# Patient Record
Sex: MALE | URBAN - METROPOLITAN AREA
[De-identification: names, ages, dates, MRNs, and addresses within clinical notes are randomized per-mention and may not be internally consistent; named-entity substitution may affect disease eponyms.]

---

## 2024-09-20 ENCOUNTER — OFFICE VISIT (OUTPATIENT)
Dept: URBAN - METROPOLITAN AREA CLINIC 68 | Facility: CLINIC | Age: 79
End: 2024-09-20

## 2025-01-07 ENCOUNTER — DASHBOARD ENCOUNTERS (OUTPATIENT)
Age: 80
End: 2025-01-07

## 2025-01-07 ENCOUNTER — OFFICE VISIT (OUTPATIENT)
Dept: URBAN - METROPOLITAN AREA CLINIC 63 | Facility: CLINIC | Age: 80
End: 2025-01-07
Payer: COMMERCIAL

## 2025-01-07 ENCOUNTER — LAB OUTSIDE AN ENCOUNTER (OUTPATIENT)
Dept: URBAN - METROPOLITAN AREA CLINIC 63 | Facility: CLINIC | Age: 80
End: 2025-01-07

## 2025-01-07 VITALS
SYSTOLIC BLOOD PRESSURE: 120 MMHG | HEART RATE: 65 BPM | WEIGHT: 165 LBS | DIASTOLIC BLOOD PRESSURE: 62 MMHG | TEMPERATURE: 97.8 F | HEIGHT: 70 IN | OXYGEN SATURATION: 95 % | BODY MASS INDEX: 23.62 KG/M2

## 2025-01-07 DIAGNOSIS — Z86.73 HISTORY OF CVA (CEREBROVASCULAR ACCIDENT): ICD-10-CM

## 2025-01-07 DIAGNOSIS — R13.12 OROPHARYNGEAL DYSPHAGIA: ICD-10-CM

## 2025-01-07 PROBLEM — 71457002: Status: ACTIVE | Noted: 2025-01-07

## 2025-01-07 PROCEDURE — 99204 OFFICE O/P NEW MOD 45 MIN: CPT

## 2025-01-07 RX ORDER — IPRATROPIUM BROMIDE AND ALBUTEROL SULFATE 2.5; .5 MG/3ML; MG/3ML
SOLUTION RESPIRATORY (INHALATION)
Qty: 90 MILLILITER | Status: ACTIVE | COMMUNITY

## 2025-01-07 RX ORDER — METOPROLOL TARTRATE 50 MG/1
TAKE 1 TABLET BY MOUTH TWICE DAILY TABLET, FILM COATED ORAL
Qty: 180 EACH | Refills: 0 | Status: ACTIVE | COMMUNITY

## 2025-01-07 RX ORDER — FLUOXETINE 10 MG/1
TAKE 1 CAPSULE BY MOUTH DAILY IN THE MORNING FOR 2 WEEKS THEN TAKE 2 CAPSULES BY MOUTH DAILY CAPSULE ORAL
Qty: 45 EACH | Refills: 0 | Status: ACTIVE | COMMUNITY

## 2025-01-07 RX ORDER — ATORVASTATIN CALCIUM 20 MG/1
TAKE 1 TABLET VIA PEG TUBE EVERY DAY TABLET ORAL
Qty: 30 EACH | Refills: 0 | Status: ACTIVE | COMMUNITY

## 2025-01-07 RX ORDER — APIXABAN 5 MG/1
TAKE 1 TABLET BY MOUTH TWICE DAILY TABLET, FILM COATED ORAL
Qty: 170 EACH | Refills: 0 | Status: ACTIVE | COMMUNITY

## 2025-01-07 RX ORDER — DONEPEZIL HYDROCHLORIDE 10 MG/1
TAKE 1 TABLET BY MOUTH EVERY NIGHT AT BEDTIME TABLET, FILM COATED ORAL
Qty: 90 EACH | Refills: 0 | Status: ACTIVE | COMMUNITY

## 2025-01-07 RX ORDER — IPRATROPIUM BROMIDE AND ALBUTEROL SULFATE 2.5; .5 MG/3ML; MG/3ML
USE 1 VIAL VIA NEBULIZER EVERY 6 HOURS AS NEEDED FOR COUGH / WHEEZING AS DIRECTED SOLUTION RESPIRATORY (INHALATION)
Qty: 90 MILLILITER | Refills: 0 | Status: ACTIVE | COMMUNITY

## 2025-01-07 RX ORDER — METFORMIN HYDROCHLORIDE 500 MG/1
TAKE 1 TABLET BY MOUTH TWICE DAILY TABLET, FILM COATED ORAL
Qty: 180 EACH | Refills: 0 | Status: ACTIVE | COMMUNITY

## 2025-01-07 NOTE — HPI-PREVIOUS IMAGING
9/28/2024 chest x-ray consistent with cardiomegaly, blunting of left costophrenic angle suggestive of left-sided mild pleural effusion and thickening with no interval changing 9/28/2024 CT chest abdomen pelvis without contrast consistent with Interval resolution of subpleural consolidation atelectatic changes in posterior aspect of left lung Mildly displaced fractures of right 6th through 8th ribs and lateral aspect, new interval finding of compression fracture L1, 15 to 20 mm gallbladder calculi with no evidence of acute cholecystitis Redemonstration of faintly hypodense 13 mm lesion in tail of pancreas Uncomplicated colonic diverticulosis Mild thick walled urinary bladder showing 2 mm calculus and 3 mm calcific foci calculus Mild prostatomegaly

## 2025-01-07 NOTE — HPI-ZZZTODAY'S VISIT
Patient is a very pleasant 79-year-old male who presents for barium swallow.  He is a new patient to our practice will be establishing with Dr. Lo today.  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  Past surgical history reviewed. Diet:Glucerna 355 mL via PEG 3 times a day flush PEG with 200 mL free water before and after each PEG feed.  Flush PEG tube before and after medication administration of 30 mL free water.  Patient presents with his daughter's primary caregiver for possible barium swallow.  He is nonverbal although she expresses that he is able to understand some Amharic to follow directions.  She relates that he had a CVA in February 2024.  He had a peg tube placed at that time due to inability to protect his airway.  He had some complications in October 2024 with blood coming out of the PEG tube but since then has had no difficulty.  Previously he followed with an at-home speech-language pathologist, however at the end of the year they lost coverage for this provider.  As of now she is getting Glucerna over-the-counter but is running low on syringes and supplies for the PEG tube.  He has not followed with nutritionist outpatient.  She would like to have him reevaluated for his swallowing capabilities.

## 2025-01-07 NOTE — PHYSICAL EXAM GASTROINTESTINAL
Abdomen , soft, nontender, nondistended , no guarding or rigidity , no masses palpable , normal bowel sounds , Liver and Spleen, no hepatosplenomegaly , liver nontender, peg tube within normal limits with no cracking or yellowing, well healed

## 2025-01-07 NOTE — HPI-PREVIOUS LABS
9/29/2024 CMP significant for sodium 149, chloride 112, BUN 47, creatinine 1.65, albumin 2.7, GFR 42.2, BUN/creatinine ratio 28.5, CBC significant for WBC within normal limits, RBC 4.24, hemoglobin 11.3, hematocrit 35.4, MCH 26.7, MCHC 31.9, RDW 15.9, MPV 13.6, monocytes 1.0

## 2025-01-08 ENCOUNTER — TELEPHONE ENCOUNTER (OUTPATIENT)
Dept: URBAN - METROPOLITAN AREA CLINIC 63 | Facility: CLINIC | Age: 80
End: 2025-01-08

## 2025-01-09 ENCOUNTER — TELEPHONE ENCOUNTER (OUTPATIENT)
Dept: URBAN - METROPOLITAN AREA CLINIC 63 | Facility: CLINIC | Age: 80
End: 2025-01-09

## 2025-01-13 PROBLEM — 275526006: Status: ACTIVE | Noted: 2025-01-13

## 2025-01-13 PROBLEM — 70342003: Status: ACTIVE | Noted: 2025-01-13

## 2025-01-13 PROBLEM — 3855007: Status: ACTIVE | Noted: 2025-01-13

## 2025-01-26 ENCOUNTER — TELEPHONE ENCOUNTER (OUTPATIENT)
Dept: URBAN - METROPOLITAN AREA CLINIC 63 | Facility: CLINIC | Age: 80
End: 2025-01-26